# Patient Record
Sex: FEMALE | Race: WHITE
[De-identification: names, ages, dates, MRNs, and addresses within clinical notes are randomized per-mention and may not be internally consistent; named-entity substitution may affect disease eponyms.]

---

## 2017-09-01 ENCOUNTER — HOSPITAL ENCOUNTER (OUTPATIENT)
Dept: HOSPITAL 17 - CLAB | Age: 47
End: 2017-09-01
Payer: COMMERCIAL

## 2017-09-01 DIAGNOSIS — N39.0: Primary | ICD-10-CM

## 2017-09-01 DIAGNOSIS — N95.1: ICD-10-CM

## 2017-09-01 LAB
BACTERIA #/AREA URNS HPF: (no result) /HPF
COLOR UR: YELLOW
ERYTHROCYTE [DISTWIDTH] IN BLOOD BY AUTOMATED COUNT: 13.3 % (ref 11.6–17.2)
FSH SERPL-ACNC: 46.1 MIU/ML
GLUCOSE UR STRIP-MCNC: (no result) MG/DL
HCT VFR BLD CALC: 39.5 % (ref 35–46)
HGB UR QL STRIP: (no result)
HYALINE CASTS #/AREA URNS LPF: 1 /LPF
KETONES UR STRIP-MCNC: (no result) MG/DL
MCH RBC QN AUTO: 29.1 PG (ref 27–34)
MCHC RBC AUTO-ENTMCNC: 33.4 % (ref 32–36)
MCV RBC AUTO: 87.1 FL (ref 80–100)
MUCOUS THREADS #/AREA URNS LPF: (no result) /LPF
NITRITE UR QL STRIP: (no result)
PLATELET # BLD: 267 TH/MM3 (ref 150–450)
RBC # BLD AUTO: 4.53 MIL/MM3 (ref 4–5.3)
REVIEW FLAG: (no result)
SP GR UR STRIP: 1.02 (ref 1–1.03)
SQUAMOUS #/AREA URNS HPF: 1 /HPF (ref 0–5)
T4 FREE SERPL-MCNC: 1.01 NG/DL (ref 0.76–1.46)
WBC # BLD AUTO: 4.2 TH/MM3 (ref 4–11)

## 2017-09-01 PROCEDURE — 87086 URINE CULTURE/COLONY COUNT: CPT

## 2017-09-01 PROCEDURE — 81001 URINALYSIS AUTO W/SCOPE: CPT

## 2017-09-01 PROCEDURE — 83001 ASSAY OF GONADOTROPIN (FSH): CPT

## 2017-09-01 PROCEDURE — 84443 ASSAY THYROID STIM HORMONE: CPT

## 2017-09-01 PROCEDURE — 85027 COMPLETE CBC AUTOMATED: CPT

## 2017-09-01 PROCEDURE — 36415 COLL VENOUS BLD VENIPUNCTURE: CPT

## 2017-09-01 PROCEDURE — 84439 ASSAY OF FREE THYROXINE: CPT

## 2018-02-22 ENCOUNTER — HOSPITAL ENCOUNTER (EMERGENCY)
Dept: HOSPITAL 17 - NEPD | Age: 48
Discharge: HOME | End: 2018-02-22
Payer: COMMERCIAL

## 2018-02-22 VITALS
HEART RATE: 80 BPM | OXYGEN SATURATION: 96 % | RESPIRATION RATE: 14 BRPM | TEMPERATURE: 98.4 F | DIASTOLIC BLOOD PRESSURE: 95 MMHG | SYSTOLIC BLOOD PRESSURE: 172 MMHG

## 2018-02-22 VITALS — HEIGHT: 62 IN | WEIGHT: 132.28 LBS | BODY MASS INDEX: 24.34 KG/M2

## 2018-02-22 VITALS — SYSTOLIC BLOOD PRESSURE: 142 MMHG | DIASTOLIC BLOOD PRESSURE: 70 MMHG
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
## 2018-02-22 DIAGNOSIS — S05.02XA: Primary | ICD-10-CM
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
## 2018-02-22 PROCEDURE — 99283 EMERGENCY DEPT VISIT LOW MDM: CPT

## 2018-02-22 NOTE — PD
HPI


Chief Complaint:  Eye Problems/Injury


Time Seen by Provider:  09:03


Travel History


International Travel<30 days:  No


Contact w/Intl Traveler<30days:  No


Traveled to known affect area:  No





History of Present Illness


HPI


47-year-old female presents to the emergency department with complaint of left 

eye pain after a clamp flicked into her eye within the last hour.  She is a 

[employer] employee.  Rates pain 8/10.  Says she is having trouble keeping it open 

secondary to the pain.  Reports photosensitivity.  Reports clear drainage.  Has 

not taken any medications or tried treatments to alleviate her symptoms.  Pain 

is aggravated with blinking and opening her eye.  No known relieving factors.  

Primary care provider is Dr. Murillo.  No known allergies.  Denies significant 

past medical history.  Has no other medical complaints.  No other modifying 

factors or associated signs and symptoms





PFSH


Past Medical History


Cancer:  No


Cardiovascular Problems:  No


Diabetes:  No


Endocrine:  No


Genitourinary:  No


Hepatitis:  No


Hiatal Hernia:  No


Immune Disorder:  No


Musculoskeletal:  No


Neurologic:  No


Psychiatric:  No


Reproductive:  Yes (CERVICAL DISPLASIA)


Respiratory:  No


Thyroid Disease:  No


Pregnant?:  Not Pregnant





Past Surgical History


Abdominal Surgery:  No


AICD:  No


Body Medical Devices:  NONE


Cardiac Surgery:  No


Ear Surgery:  No


Endocrine Surgery:  No


Eye Surgery:  No


Genitourinary Surgery:  No


Gynecologic Surgery:  Yes ( 98', CONE BIOPSY X2)


Hysterectomy:  Yes


Joint Replacement:  No


Oral Surgery:  No


Pacemaker:  No


Thoracic Surgery:  No


Other Surgery:  Yes





Social History


Alcohol Use:  No


Tobacco Use:  No


Substance Use:  No





Allergies-Medications


(Allergen,Severity, Reaction):  


Coded Allergies:  


     No Known Allergies (Verified , 14)


Reported Meds & Prescriptions





Reported Meds & Active Scripts


Active


Ibuprofen 800 Mg Tab 800 Mg PO Q6HR PRN


Tramadol (Tramadol HCl) 50 Mg Tab 50 Mg PO Q4H PRN


Erythromycin Opth Oint 5 Mg/Gm Oint 1 Applic LEFT EYE QID 7 Days








Review of Systems


Except as stated in HPI:  all other systems reviewed are Neg





Physical Exam


Narrative


GENERAL: Well-nourished, well-developed  female patient, in no acute 

distress


SKIN: Warm and dry.


HEAD: Atraumatic. Normocephalic. 


EYES: Pupils equal and round at 3 mm with brisk reaction.   PERRLA.  EOMI. Left 

lid eversion with no foreign body noted.  Left eye without scleral erythema and 

without lid edema.  No orbital tenderness, erythema or cellulitis.  Left eye 

with photophobia.  No consensual photophobia.   No scleral icterus.  Clear 

drainage.  Woods lamp exam reveals corneal abrasion at the 9oclock position at 

the edge of the iris and the sclera.


ENT: Mucosa pink and moist.  Airway patent.  


NECK: Trachea midline.  


CARDIOVASCULAR: Regular rate.


RESPIRATORY: No accessory muscle use. 


GASTROINTESTINAL: Flat.


NEUROLOGICAL: Awake and alert.  Oriented 3.  No obvious cranial nerve 

deficits.  Motor grossly within normal limits. Normal speech. 


PSYCHIATRIC: Appropriate mood and affect; insight and judgment normal.





Data


Data


Last Documented VS





Vital Signs








  Date Time  Temp Pulse Resp B/P (MAP) Pulse Ox O2 Delivery O2 Flow Rate FiO2


 


18 08:36 98.4 80 14 172/95 (120) 96   








Orders





 Orders


Proparacaine 0.5% Opth Soln (Alcaine 0.5 (18 09:30)


Ibuprofen (Motrin) (18 09:30)


Ed Discharge Order (18 10:24)








[facility]


Medical Decision Making


Medical Screen Exam Complete:  Yes


Emergency Medical Condition:  Yes


Medical Record Reviewed:  Yes


Differential Diagnosis


Corneal abrasion, eye contusion, foreign body


Narrative Course


47-year-old female physical exam consistent with left corneal abrasion.  

Patient is [employer] and pulling injury occurred at work.  Instructed patient to 

follow-up with ophthalmologist.  Erythromycin, tramadol, ibuprofen prescribed 

for home.  Instructed patient to follow up with primary care provider.  Patient 

verbalizes understanding and agreement with treatment plan.  Patient is 

medically cleared and stable for discharge.  Discussed reasons to return to the 

emergency department.  Patient agrees with treatment plan.  The patients vital 

signs are stable and the patient is stable for outpatient follow-up and 

treatment.  Patient discharged home, stable and in no acute distress.





Diagnosis





 Primary Impression:  


 Corneal abrasion, left


 Qualified Codes:  S05.02XA - Injury of conjunctiva and corneal abrasion 

without foreign body, left eye, initial encounter


Referrals:  


Brianna Carlos MD





Ophthalmologist





Primary Care Physician


Patient Instructions:  Corneal Abrasion (ED), General Instructions


Departure Forms:  Tests/Procedures, Work Release   Enter return to work date:  

2018





***Additional Instructions:  


Ibuprofen or Tylenol as directed and as needed to reduce pain


Erythromycin ointment to the eye as directed 


Do not patch the eye


Do not rub the eye


Refrigerated eye drops as needed to reduce pain


Cool compresses to the eye as needed to reduce pain


Follow-up with ophthalmology


Primary care provider


Return to the emergency department immediately with worsening of symptoms


***Med/Other Pt SpecificInfo:  No Change to Meds, No Meds Exist/No RX given


Scripts


Ibuprofen (Ibuprofen) 800 Mg Tab


800 MG PO Q6HR Y for PAIN, #30 TAB 0 Refills


   Prov: Ana Huddleston         18 


Tramadol (Tramadol) 50 Mg Tab


50 MG PO Q4H Y for PAIN, #10 TAB 0 Refills


   Prov: Ana Huddleston         18 


Erythromycin Opth Oint (Erythromycin Opth Oint) 5 Mg/Gm Oint


1 APPLIC LEFT EYE QID for Infection for 7 Days, #1 TUBE 0 Refills


   Prov: Ana Huddleston         18


Disposition:  01 DISCHARGE HOME


Condition:  Stable











Ana Huddleston 2018 09:56